# Patient Record
Sex: MALE | Race: WHITE | NOT HISPANIC OR LATINO | Employment: FULL TIME | URBAN - METROPOLITAN AREA
[De-identification: names, ages, dates, MRNs, and addresses within clinical notes are randomized per-mention and may not be internally consistent; named-entity substitution may affect disease eponyms.]

---

## 2020-07-24 ENCOUNTER — OFFICE VISIT (OUTPATIENT)
Dept: NEUROSURGERY | Facility: CLINIC | Age: 45
End: 2020-07-24
Payer: MEDICAID

## 2020-07-24 DIAGNOSIS — M54.12 CERVICAL RADICULOPATHY: Primary | ICD-10-CM

## 2020-07-24 DIAGNOSIS — R29.2 HYPERREFLEXIA OF LOWER EXTREMITY: ICD-10-CM

## 2020-07-24 PROCEDURE — 99999 PR PBB SHADOW E&M-NEW PATIENT-LVL III: ICD-10-PCS | Mod: PBBFAC,,, | Performed by: PHYSICIAN ASSISTANT

## 2020-07-24 PROCEDURE — 99204 PR OFFICE/OUTPT VISIT, NEW, LEVL IV, 45-59 MIN: ICD-10-PCS | Mod: S$PBB,,, | Performed by: PHYSICIAN ASSISTANT

## 2020-07-24 PROCEDURE — 99999 PR PBB SHADOW E&M-NEW PATIENT-LVL III: CPT | Mod: PBBFAC,,, | Performed by: PHYSICIAN ASSISTANT

## 2020-07-24 PROCEDURE — 99203 OFFICE O/P NEW LOW 30 MIN: CPT | Mod: PBBFAC,PN | Performed by: PHYSICIAN ASSISTANT

## 2020-07-24 PROCEDURE — 99204 OFFICE O/P NEW MOD 45 MIN: CPT | Mod: S$PBB,,, | Performed by: PHYSICIAN ASSISTANT

## 2020-07-24 RX ORDER — GABAPENTIN 300 MG/1
CAPSULE ORAL
Qty: 90 CAPSULE | Refills: 11 | Status: SHIPPED | OUTPATIENT
Start: 2020-07-24

## 2020-07-24 NOTE — PROGRESS NOTES
[facility] Neurosurgery  Clinic Consult     Consult Requested By: Self, Aaareferral  PCP: Primary Doctor No    Chief Complaint:   Chief Complaint   Patient presents with    Neck Pain         No past medical history on file.  No past surgical history on file.  Family History   Problem Relation Age of Onset    Heart failure Father     Cancer Maternal Uncle     Cancer Maternal Grandmother      Social History     Tobacco Use    Smoking status: Former Smoker     Types: Cigarettes   Substance Use Topics    Alcohol use: No    Drug use: No      Review of patient's allergies indicates:   Allergen Reactions    Pcn [penicillins]        Current Outpatient Medications:     alprazolam (XANAX) 1 MG tablet, TK 1 T PO  D PRF ANXIETY, Disp: , Rfl: 2    aspirin (ECOTRIN) 81 MG EC tablet, Take 81 mg by mouth once daily., Disp: , Rfl:     venlafaxine (EFFEXOR-XR) 150 MG Cp24, TK ONE C PO  QAM, Disp: , Rfl: 2    zolpidem (AMBIEN) 10 mg Tab, TK 1 T PO  QHS, Disp: , Rfl: 2    gabapentin (NEURONTIN) 300 MG capsule, Take 1 capsule up to 3 times daily as tolerated, Disp: 90 capsule, Rfl: 11    HYDROcodone-acetaminophen (NORCO) 5-325 mg per tablet, Take 2 tablets by mouth every 6 (six) hours as needed for Pain. (Patient not taking: Reported on 7/24/2020), Disp: 12 tablet, Rfl: 0    methylPREDNISolone (MEDROL DOSEPACK) 4 mg tablet, See instructions (Patient not taking: Reported on 7/24/2020), Disp: 1 Package, Rfl: 0    Review of Systems:   Constitutional: no fever, chills or night sweats. No changes in weight   Eyes: no visual changes   ENT: no nasal congestion or sore throat   Respiratory: no cough or shortness of breath   Cardiovascular: no chest pain or palpitations   Gastrointestinal: no nausea or vomiting   Genitourinary: no hematuria or dysuria   Integument/Breast: no rash or pruritis   Hematologic/Lymphatic: no easy bruising or lymphadenopathy   Musculoskeletal: +neck pain, arm pain   Neurological: no seizures or  "tremors  +paresthesias   Behavioral/Psych: no auditory or visual hallucinations   Endocrine: no heat or cold intolerance         OBJECTIVE:     Vital Signs (Most Recent):  /88  HR 79  Weight 107.1 kg  Height 6'2"  Temp 98.0  Pain 8/10     Estimated body mass index is 35.95 kg/m² as calculated from the following:    Height as of 7/14/20: 6' 2" (1.88 m).    Weight as of 7/14/20: 127 kg (279 lb 15.8 oz).    Physical Exam:   General: well developed, well nourished, no distress.   Neurologic: Alert and oriented. Thought content appropriate. GCS 15.   Language: No aphasia  Speech: No dysarthria  Head: normocephalic, atraumatic  Eyes: EOMI.  Neck: trachea midline, no JVD   Cardiovascular: no LE edema  Pulmonary: normal respirations, no signs of respiratory distress  Abdomen: non-distended  Sensory: intact to light touch throughout  Skin: Skin is warm, dry and intact.    Motor Strength: Moves all extremities spontaneously with good tone. No abnormal movements seen.     Strength  Deltoids Triceps Biceps Wrist Extension Wrist Flexion Hand  Interossei   Upper: R 5/5 5/5 5/5 5/5 5/5 5/5 5/5    L 5/5 5/5 5/5 5/5 5/5 5/5 5/5     Iliopsoas Quadriceps Knee  Flexion Tibialis  anterior Gastro- cnemius EHL    Lower: R 5/5 5/5 5/5 5/5 5/5 5/5     L 5/5 5/5 5/5 5/5 5/5 5/5      DTR's: 2+ RUE, 1+ left biceps, 3+ BLE   Vargas: absent  Gait: normal    Cervical Spine: full ROM, no TTP, negative Spurling's         Imaging:     I have independently reviewed the following imaging     XR cervical spine  FINDINGS:  AP, lateral, and odontoid views of the cervical spine demonstrate reversal of the normal cervical lordosis which is likely positional.  There is no spondylolisthesis.  There is no loss of vertebral body height.  The dens is intact.  The anterior atlantoaxial articulation is normal.  The lung apices are clear.         Provider Dictation:     Fredrick Ku is a 44 y.o. right handed male former smoker without any major " medical problems who presents for evaluation of neck and left arm pain. Pain began 2 weeks ago after lifting something heavy at the gym.  Pain begins in the neck and radiates into the left upper extremity, nonspecific distribution, into the hand involving all fingers.  Patient was seen in the ED with this complaint.  He was prescribed a Medrol dose pack, pain medication, muscle relaxants.  He reports mild improvement since his ED visit.  He reports the pain is associated with numbness and tingling.  He reports more pain in the extensor surface of the forearm.  He describes pain in this area like a muscle spasm.  He denies weakness.  He has not completed physical therapy or received injections with pain management.  He denies difficulty with hand dexterity or dropping objects.  Denies gait imbalance.  Denies bowel or bladder dysfunction.  He reports if he puts his arm above his head, it relieves the pain.    Pertinent and recent history, provider evaluations, imaging and data reviewed in EPIC    VAS 8/10 neck and left arm pain   PHQ 6  Neck Disability Index 42    Imaging independently reviewed. Xray cervical spine reveals straightening of lordosis and mild loss of disc height.     On exam, patient has a fluid gait.  He has full strength in upper and lower extremities.  2+ reflexes upper extremities with exception of diminished left biceps reflex.  3+ reflexes in lower extremities.  Negative Vargas's.    In conclusion, patient is a 44 y.o. male with acute neck pain and cervical radiculopathy.  Patient has failed to improve with oral steroids.  He has not attended physical therapy or received injections with pain management.  On exam he has full strength, but he has hyperreflexia in lower extremities and diminished biceps reflex on the left.  I have ordered MRI cervical spine to further evaluate his hyper reflexia an cause of his radiculopathy.  I have also referred the patient to physical therapy and prescribed the  patient gabapentin for neuropathic pain relief.  I will follow up patient by phone once imaging is complete to discuss results and provide further recommendations.      Patient verbalized understanding of plan. Encouraged to call with any questions or concerns.     Total time spent counseling greater than fifty percent of total visit time.  This note was partially dictated using voice recognition software, so please excuse any errors that were not corrected.    Visit Diagnosis and Orders   Cervical radiculopathy  -     MRI Cervical Spine Without Contrast; Future; Expected date: 07/24/2020  -     Ambulatory referral/consult to Physical/Occupational Therapy; Future; Expected date: 07/31/2020    Hyperreflexia of lower extremity    Other orders  -     gabapentin (NEURONTIN) 300 MG capsule; Take 1 capsule up to 3 times daily as tolerated  Dispense: 90 capsule; Refill: 11

## 2022-02-08 ENCOUNTER — LAB VISIT (OUTPATIENT)
Dept: PRIMARY CARE CLINIC | Facility: OTHER | Age: 47
End: 2022-02-08
Attending: INTERNAL MEDICINE
Payer: MEDICAID

## 2022-02-08 DIAGNOSIS — Z20.822 ENCOUNTER FOR LABORATORY TESTING FOR COVID-19 VIRUS: ICD-10-CM

## 2022-02-08 PROCEDURE — U0003 INFECTIOUS AGENT DETECTION BY NUCLEIC ACID (DNA OR RNA); SEVERE ACUTE RESPIRATORY SYNDROME CORONAVIRUS 2 (SARS-COV-2) (CORONAVIRUS DISEASE [COVID-19]), AMPLIFIED PROBE TECHNIQUE, MAKING USE OF HIGH THROUGHPUT TECHNOLOGIES AS DESCRIBED BY CMS-2020-01-R: HCPCS | Performed by: INTERNAL MEDICINE

## 2022-02-09 LAB
SARS-COV-2 RNA RESP QL NAA+PROBE: NOT DETECTED
SARS-COV-2- CYCLE NUMBER: NORMAL